# Patient Record
Sex: FEMALE | Race: WHITE | NOT HISPANIC OR LATINO | Employment: UNEMPLOYED | URBAN - METROPOLITAN AREA
[De-identification: names, ages, dates, MRNs, and addresses within clinical notes are randomized per-mention and may not be internally consistent; named-entity substitution may affect disease eponyms.]

---

## 2022-07-01 ENCOUNTER — TELEPHONE (OUTPATIENT)
Dept: NEUROLOGY | Facility: CLINIC | Age: 47
End: 2022-07-01

## 2022-07-01 NOTE — TELEPHONE ENCOUNTER
----- Message from Tami Marcano sent at 7/1/2022  2:07 PM CDT -----  Contact: Vee  Patient is calling to speak to the nurse regarding a referral. Reports trying to schedule an appt. Please give patient a call back at .[phone]

## 2022-07-01 NOTE — TELEPHONE ENCOUNTER
Spoke with patient in regards to getting scheduled. I advise patient that would need [facility] in regards to scheduling. Patient did verbalized understanding.

## 2022-07-14 ENCOUNTER — TELEPHONE (OUTPATIENT)
Dept: NEUROLOGY | Facility: CLINIC | Age: 47
End: 2022-07-14

## 2022-07-14 NOTE — TELEPHONE ENCOUNTER
----- Message from Tanya Mosquera sent at 7/14/2022  2:35 PM CDT -----  please call Juliane/Dr Cortes @ [phone] regarding pt referral, need to know status, it was on 7/1

## 2023-06-01 ENCOUNTER — TELEPHONE (OUTPATIENT)
Dept: NEUROLOGY | Facility: CLINIC | Age: 48
End: 2023-06-01

## 2023-06-01 NOTE — TELEPHONE ENCOUNTER
----- Message from Alba Uribe sent at 6/1/2023  3:52 PM CDT -----  Contact: [phone]  Calling requesting status of referral sent from [facility] . Please call back at [phone]  . Nancy/melvina